# Patient Record
Sex: FEMALE | Race: WHITE
[De-identification: names, ages, dates, MRNs, and addresses within clinical notes are randomized per-mention and may not be internally consistent; named-entity substitution may affect disease eponyms.]

---

## 2017-09-08 ENCOUNTER — HOSPITAL ENCOUNTER (OUTPATIENT)
Dept: HOSPITAL 15 - RAD HDHVI | Age: 82
Discharge: HOME | End: 2017-09-08
Attending: INTERNAL MEDICINE
Payer: MEDICARE

## 2017-09-08 VITALS — BODY MASS INDEX: 25.49 KG/M2 | WEIGHT: 153 LBS | HEIGHT: 65 IN

## 2017-09-08 DIAGNOSIS — I10: Primary | ICD-10-CM

## 2017-09-08 DIAGNOSIS — E11.9: ICD-10-CM

## 2017-09-08 DIAGNOSIS — E78.00: ICD-10-CM

## 2017-09-08 DIAGNOSIS — R06.00: ICD-10-CM

## 2017-09-08 PROCEDURE — 78452 HT MUSCLE IMAGE SPECT MULT: CPT

## 2017-09-08 PROCEDURE — 96374 THER/PROPH/DIAG INJ IV PUSH: CPT

## 2017-09-08 PROCEDURE — 93005 ELECTROCARDIOGRAM TRACING: CPT

## 2017-09-08 PROCEDURE — 96375 TX/PRO/DX INJ NEW DRUG ADDON: CPT

## 2019-04-09 ENCOUNTER — HOSPITAL ENCOUNTER (OUTPATIENT)
Dept: HOSPITAL 15 - RAD HDHVI | Age: 84
Discharge: HOME | End: 2019-04-09
Attending: INTERNAL MEDICINE
Payer: MEDICARE

## 2019-04-09 DIAGNOSIS — R00.2: Primary | ICD-10-CM

## 2019-04-09 DIAGNOSIS — E78.5: ICD-10-CM

## 2019-04-09 DIAGNOSIS — E11.9: ICD-10-CM

## 2019-04-09 DIAGNOSIS — R00.0: ICD-10-CM

## 2019-04-09 DIAGNOSIS — I10: ICD-10-CM

## 2019-04-09 PROCEDURE — 93306 TTE W/DOPPLER COMPLETE: CPT

## 2019-04-09 PROCEDURE — 93880 EXTRACRANIAL BILAT STUDY: CPT

## 2019-04-29 ENCOUNTER — HOSPITAL ENCOUNTER (OUTPATIENT)
Dept: HOSPITAL 15 - RAD HDHVI | Age: 84
Discharge: HOME | End: 2019-04-29
Attending: INTERNAL MEDICINE
Payer: MEDICARE

## 2019-04-29 VITALS — WEIGHT: 154 LBS | BODY MASS INDEX: 25.66 KG/M2 | HEIGHT: 65 IN

## 2019-04-29 DIAGNOSIS — R06.02: ICD-10-CM

## 2019-04-29 DIAGNOSIS — I10: ICD-10-CM

## 2019-04-29 DIAGNOSIS — E11.9: Primary | ICD-10-CM

## 2019-04-29 DIAGNOSIS — E78.00: ICD-10-CM

## 2019-04-29 DIAGNOSIS — R06.00: ICD-10-CM

## 2019-04-29 PROCEDURE — 96375 TX/PRO/DX INJ NEW DRUG ADDON: CPT

## 2019-04-29 PROCEDURE — 93005 ELECTROCARDIOGRAM TRACING: CPT

## 2019-04-29 PROCEDURE — 78452 HT MUSCLE IMAGE SPECT MULT: CPT

## 2019-04-29 PROCEDURE — 96374 THER/PROPH/DIAG INJ IV PUSH: CPT

## 2019-09-30 ENCOUNTER — HOSPITAL ENCOUNTER (OUTPATIENT)
Dept: HOSPITAL 15 - RAD HDHVI | Age: 84
Discharge: HOME | End: 2019-09-30
Attending: INTERNAL MEDICINE
Payer: MEDICARE

## 2019-09-30 DIAGNOSIS — M16.12: Primary | ICD-10-CM

## 2019-09-30 DIAGNOSIS — M25.78: ICD-10-CM

## 2019-09-30 DIAGNOSIS — I70.90: ICD-10-CM

## 2019-09-30 DIAGNOSIS — M54.17: ICD-10-CM

## 2019-09-30 DIAGNOSIS — M48.04: ICD-10-CM

## 2019-09-30 DIAGNOSIS — N13.30: ICD-10-CM

## 2019-09-30 PROCEDURE — 73700 CT LOWER EXTREMITY W/O DYE: CPT

## 2019-09-30 PROCEDURE — 72131 CT LUMBAR SPINE W/O DYE: CPT

## 2020-02-10 ENCOUNTER — HOSPITAL ENCOUNTER (OUTPATIENT)
Dept: HOSPITAL 15 - LAB | Age: 85
Discharge: HOME | End: 2020-02-10
Attending: INTERNAL MEDICINE
Payer: MEDICARE

## 2020-02-10 DIAGNOSIS — R94.4: Primary | ICD-10-CM

## 2020-02-10 PROCEDURE — 82565 ASSAY OF CREATININE: CPT

## 2020-02-10 PROCEDURE — 36415 COLL VENOUS BLD VENIPUNCTURE: CPT

## 2020-02-11 ENCOUNTER — HOSPITAL ENCOUNTER (OUTPATIENT)
Dept: HOSPITAL 15 - RAD HDHVI | Age: 85
Discharge: HOME | End: 2020-02-11
Attending: INTERNAL MEDICINE
Payer: MEDICARE

## 2020-02-11 VITALS — DIASTOLIC BLOOD PRESSURE: 67 MMHG | SYSTOLIC BLOOD PRESSURE: 131 MMHG

## 2020-02-11 VITALS — DIASTOLIC BLOOD PRESSURE: 64 MMHG | SYSTOLIC BLOOD PRESSURE: 127 MMHG

## 2020-02-11 DIAGNOSIS — R10.9: ICD-10-CM

## 2020-02-11 DIAGNOSIS — K57.92: ICD-10-CM

## 2020-02-11 DIAGNOSIS — I70.0: Primary | ICD-10-CM

## 2020-02-11 DIAGNOSIS — R97.0: ICD-10-CM

## 2020-02-11 DIAGNOSIS — K44.9: ICD-10-CM

## 2020-02-11 PROCEDURE — 74177 CT ABD & PELVIS W/CONTRAST: CPT

## 2020-08-27 ENCOUNTER — HOSPITAL ENCOUNTER (INPATIENT)
Dept: HOSPITAL 15 - ER | Age: 85
LOS: 9 days | Discharge: NURSING, REHAB OR LONG-TERM CARE | DRG: 481 | End: 2020-09-05
Attending: INTERNAL MEDICINE | Admitting: INTERNAL MEDICINE
Payer: MEDICARE

## 2020-08-27 VITALS — HEIGHT: 62 IN | BODY MASS INDEX: 29.9 KG/M2 | WEIGHT: 162.48 LBS

## 2020-08-27 DIAGNOSIS — Z80.6: ICD-10-CM

## 2020-08-27 DIAGNOSIS — E44.0: ICD-10-CM

## 2020-08-27 DIAGNOSIS — M47.816: ICD-10-CM

## 2020-08-27 DIAGNOSIS — Z20.828: ICD-10-CM

## 2020-08-27 DIAGNOSIS — E11.9: ICD-10-CM

## 2020-08-27 DIAGNOSIS — S72.141A: Primary | ICD-10-CM

## 2020-08-27 DIAGNOSIS — M81.0: ICD-10-CM

## 2020-08-27 DIAGNOSIS — D62: ICD-10-CM

## 2020-08-27 DIAGNOSIS — E87.6: ICD-10-CM

## 2020-08-27 DIAGNOSIS — Z85.038: ICD-10-CM

## 2020-08-27 DIAGNOSIS — Z80.3: ICD-10-CM
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
## 2020-08-27 DIAGNOSIS — G47.33: ICD-10-CM
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
## 2020-08-27 DIAGNOSIS — E78.5: ICD-10-CM

## 2020-08-27 DIAGNOSIS — I10: ICD-10-CM
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
## 2020-08-27 DIAGNOSIS — R64: ICD-10-CM
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
## 2020-08-27 DIAGNOSIS — M47.812: ICD-10-CM

## 2020-08-27 LAB
ALBUMIN SERPL-MCNC: 3.2 G/DL (ref 3.4–5)
ALP SERPL-CCNC: 48 U/L (ref 45–117)
ALT SERPL-CCNC: 18 U/L (ref 13–56)
ANION GAP SERPL CALCULATED.3IONS-SCNC: 6 MMOL/L (ref 5–15)
APTT PPP: 20.5 SEC (ref 23–31.2)
BILIRUB SERPL-MCNC: 0.3 MG/DL (ref 0.2–1)
BUN SERPL-MCNC: 27 MG/DL (ref 7–18)
BUN/CREAT SERPL: 35.5
CALCIUM SERPL-MCNC: 8.2 MG/DL (ref 8.5–10.1)
CHLORIDE SERPL-SCNC: 110 MMOL/L (ref 98–107)
CO2 SERPL-SCNC: 24 MMOL/L (ref 21–32)
GLUCOSE SERPL-MCNC: 180 MG/DL (ref 74–106)
HCT VFR BLD AUTO: 32.2 % (ref 36–46)
HGB BLD-MCNC: 10.5 G/DL (ref 12.2–16.2)
INR PPP: 0.97 (ref 0.9–1.15)
MAGNESIUM SERPL-MCNC: 1.9 MG/DL (ref 1.6–2.6)
MCH RBC QN AUTO: 27.3 PG (ref 28–32)
MCV RBC AUTO: 84 FL (ref 80–100)
NRBC BLD QL AUTO: 0.1 %
POTASSIUM SERPL-SCNC: 3.4 MMOL/L (ref 3.5–5.1)
PROT SERPL-MCNC: 6.6 G/DL (ref 6.4–8.2)
SODIUM SERPL-SCNC: 140 MMOL/L (ref 136–145)

## 2020-08-27 PROCEDURE — 74176 CT ABD & PELVIS W/O CONTRAST: CPT

## 2020-08-27 PROCEDURE — 97116 GAIT TRAINING THERAPY: CPT

## 2020-08-27 PROCEDURE — 85014 HEMATOCRIT: CPT

## 2020-08-27 PROCEDURE — 97163 PT EVAL HIGH COMPLEX 45 MIN: CPT

## 2020-08-27 PROCEDURE — 81001 URINALYSIS AUTO W/SCOPE: CPT

## 2020-08-27 PROCEDURE — 82565 ASSAY OF CREATININE: CPT

## 2020-08-27 PROCEDURE — 86900 BLOOD TYPING SEROLOGIC ABO: CPT

## 2020-08-27 PROCEDURE — 72125 CT NECK SPINE W/O DYE: CPT

## 2020-08-27 PROCEDURE — 82962 GLUCOSE BLOOD TEST: CPT

## 2020-08-27 PROCEDURE — 86920 COMPATIBILITY TEST SPIN: CPT

## 2020-08-27 PROCEDURE — 36415 COLL VENOUS BLD VENIPUNCTURE: CPT

## 2020-08-27 PROCEDURE — 85730 THROMBOPLASTIN TIME PARTIAL: CPT

## 2020-08-27 PROCEDURE — 85018 HEMOGLOBIN: CPT

## 2020-08-27 PROCEDURE — 86901 BLOOD TYPING SEROLOGIC RH(D): CPT

## 2020-08-27 PROCEDURE — 70450 CT HEAD/BRAIN W/O DYE: CPT

## 2020-08-27 PROCEDURE — 76000 FLUOROSCOPY <1 HR PHYS/QHP: CPT

## 2020-08-27 PROCEDURE — 97530 THERAPEUTIC ACTIVITIES: CPT

## 2020-08-27 PROCEDURE — 73501 X-RAY EXAM HIP UNI 1 VIEW: CPT

## 2020-08-27 PROCEDURE — 83735 ASSAY OF MAGNESIUM: CPT

## 2020-08-27 PROCEDURE — 97110 THERAPEUTIC EXERCISES: CPT

## 2020-08-27 PROCEDURE — 72192 CT PELVIS W/O DYE: CPT

## 2020-08-27 PROCEDURE — 85025 COMPLETE CBC W/AUTO DIFF WBC: CPT

## 2020-08-27 PROCEDURE — 71045 X-RAY EXAM CHEST 1 VIEW: CPT

## 2020-08-27 PROCEDURE — 93005 ELECTROCARDIOGRAM TRACING: CPT

## 2020-08-27 PROCEDURE — 85610 PROTHROMBIN TIME: CPT

## 2020-08-27 PROCEDURE — 86850 RBC ANTIBODY SCREEN: CPT

## 2020-08-27 PROCEDURE — 80053 COMPREHEN METABOLIC PANEL: CPT

## 2020-08-27 PROCEDURE — 80048 BASIC METABOLIC PNL TOTAL CA: CPT

## 2020-08-28 VITALS — SYSTOLIC BLOOD PRESSURE: 132 MMHG | DIASTOLIC BLOOD PRESSURE: 70 MMHG

## 2020-08-28 VITALS — DIASTOLIC BLOOD PRESSURE: 55 MMHG | SYSTOLIC BLOOD PRESSURE: 117 MMHG

## 2020-08-28 VITALS — DIASTOLIC BLOOD PRESSURE: 67 MMHG | SYSTOLIC BLOOD PRESSURE: 139 MMHG

## 2020-08-28 VITALS — SYSTOLIC BLOOD PRESSURE: 138 MMHG | DIASTOLIC BLOOD PRESSURE: 73 MMHG

## 2020-08-28 RX ADMIN — Medication SCH TAB: at 09:45

## 2020-08-28 RX ADMIN — HYDROMORPHONE HYDROCHLORIDE PRN MG: 2 INJECTION, SOLUTION INTRAMUSCULAR; INTRAVENOUS; SUBCUTANEOUS at 09:45

## 2020-08-28 RX ADMIN — SODIUM CHLORIDE SCH MLS/HR: 0.9 INJECTION, SOLUTION INTRAVENOUS at 23:57

## 2020-08-28 RX ADMIN — PRAVASTATIN SODIUM SCH MG: 20 TABLET ORAL at 23:46

## 2020-08-28 RX ADMIN — TRIAMTERENE AND HYDROCHLOROTHIAZIDE SCH CAP: 25; 37.5 CAPSULE ORAL at 09:42

## 2020-08-28 RX ADMIN — CEFAZOLIN SODIUM SCH MLS/HR: 1 INJECTION, SOLUTION INTRAVENOUS at 23:44

## 2020-08-28 RX ADMIN — SODIUM CHLORIDE SCH MLS/HR: 0.9 INJECTION, SOLUTION INTRAVENOUS at 11:30

## 2020-08-28 NOTE — NUR
CLOSING SHIFT NOTE

ENDORSED CARE FOR NIGHT SHIFT RN. PATIENT HAS NO S/S OF DISTRESS/SOB OR PAIN AT THIS TIME.

## 2020-08-28 NOTE — NUR
WOUND CARE NOTE: WOUND CARE IN TO SEE PATIENT PER WOUND CARE REQUEST. PATIENT ADMITTED TO 
[facility] FOR RIGHT HIP FRACTURE. PATIENT HAS NO OPEN WOUNDS. PATIENT IS SCHEDULED FOR SURGERY 
LATER IN THE DAY. PATIENT RYNE SCORE IS 15. 



RECOMMEND: FREQUENT Q2HOUR REPOSITIONING AS CONDITION ALLOWS. REDISTRIBUTE PRESSURE 
UTILIZING PILLOWS AND WEDGES. SKIN/WOUND CARE PLAN. CONTINUED MONITORING BY WOUND CARE TEAM.

## 2020-08-28 NOTE — NUR
PATIENT TAKEN DOWN TO PRE-OP VIA BED. REPORT GIVEN TO PREOP RN BECCA. PATIENT HAS NO S/S OF 
DISTRESS/SOB OR PAIN AT THIS TIME.

## 2020-08-28 NOTE — NUR
Patient arrived on the floor on a stretcher for right hip fracture. Alert and oriented x4. 
No SOB and no acute distress seen. Only experience pain on activity as per patient. Refused 
pain meds right now. Educate on fall safety. Call light within reach, bed on low position, 
refused non skid socks. Inform POC. patient verbalized understanding.Will continue to 
educate and monitor patient.

## 2020-08-28 NOTE — NUR
Opening Shift Note

Assumed care of patient, AAOX4.  No S/S of distress/SOB or pain. Dressing X2 to right hip 
C/D/I and pedal pulses present. NS running @ 100mL. Casillas patent and draining to gravity. 
Bed in lowest locked position, safety precautions in place and call light within reach. 
Instructed on POC and to call for assist PRN, will continue to monitor for changes Q1hr and 
PRN.

-------------------------------------------------------------------------------

Signed:    08/29/20 at 0704 by FLAKO TREVIZO <Co-Signature Required>

Co-Signed: 08/29/20 at 0704 by Vonnie Padilla RN

-------------------------------------------------------------------------------

## 2020-08-28 NOTE — NUR
OPENING SHIFT NOTE

ASSUMED CARE OF PATIENT FROM NIGHT SHIFT RN STEVEN. PATIENT IS AWAKE, ALERT, AND ORIENTED X4. 
PATIENT HAS NO S/S OF DISTRESS/SOB OR PAIN. INSTRUCTED PATIENT ON POC, PATIENT VERBALIZED 
UNDERSTANDING. BED IS IN LOWEST POSITION WITH SIDE RAILS RAISED X2, BED WHEELS LOCKED, AND 
CALL LIGHT IS WITHIN REACH. WILL CONTINUE TO MONITOR.

## 2020-08-29 VITALS — SYSTOLIC BLOOD PRESSURE: 118 MMHG | DIASTOLIC BLOOD PRESSURE: 59 MMHG

## 2020-08-29 VITALS — SYSTOLIC BLOOD PRESSURE: 126 MMHG | DIASTOLIC BLOOD PRESSURE: 53 MMHG

## 2020-08-29 VITALS — SYSTOLIC BLOOD PRESSURE: 132 MMHG | DIASTOLIC BLOOD PRESSURE: 72 MMHG

## 2020-08-29 VITALS — SYSTOLIC BLOOD PRESSURE: 110 MMHG | DIASTOLIC BLOOD PRESSURE: 55 MMHG

## 2020-08-29 VITALS — DIASTOLIC BLOOD PRESSURE: 50 MMHG | SYSTOLIC BLOOD PRESSURE: 98 MMHG

## 2020-08-29 PROCEDURE — 0QS604Z REPOSITION RIGHT UPPER FEMUR WITH INTERNAL FIXATION DEVICE, OPEN APPROACH: ICD-10-PCS | Performed by: ORTHOPAEDIC SURGERY

## 2020-08-29 RX ADMIN — CEFAZOLIN SODIUM SCH MLS/HR: 1 INJECTION, SOLUTION INTRAVENOUS at 23:22

## 2020-08-29 RX ADMIN — PRAVASTATIN SODIUM SCH MG: 20 TABLET ORAL at 23:11

## 2020-08-29 RX ADMIN — CEFAZOLIN SODIUM SCH MLS/HR: 1 INJECTION, SOLUTION INTRAVENOUS at 05:51

## 2020-08-29 RX ADMIN — CEFAZOLIN SODIUM SCH MLS/HR: 1 INJECTION, SOLUTION INTRAVENOUS at 16:47

## 2020-08-29 RX ADMIN — SODIUM CHLORIDE SCH MLS/HR: 0.9 INJECTION, SOLUTION INTRAVENOUS at 18:29

## 2020-08-29 RX ADMIN — TRIAMTERENE AND HYDROCHLOROTHIAZIDE SCH CAP: 25; 37.5 CAPSULE ORAL at 10:42

## 2020-08-29 RX ADMIN — HYDROMORPHONE HYDROCHLORIDE PRN MG: 2 INJECTION, SOLUTION INTRAMUSCULAR; INTRAVENOUS; SUBCUTANEOUS at 10:42

## 2020-08-29 RX ADMIN — SODIUM CHLORIDE SCH MLS/HR: 0.9 INJECTION, SOLUTION INTRAVENOUS at 07:30

## 2020-08-29 RX ADMIN — Medication SCH TAB: at 10:43

## 2020-08-29 NOTE — NUR
CLOSING SHIFT NOTE

ENDORSED CARE TO NIGHT SHIFT CARI ARRIOLA. PATIENT HAS NO S/S OF DISTRESS/SOB OR PAIN AT THIS 
TIME.

## 2020-08-30 VITALS — SYSTOLIC BLOOD PRESSURE: 129 MMHG | DIASTOLIC BLOOD PRESSURE: 57 MMHG

## 2020-08-30 VITALS — SYSTOLIC BLOOD PRESSURE: 122 MMHG | DIASTOLIC BLOOD PRESSURE: 55 MMHG

## 2020-08-30 VITALS — SYSTOLIC BLOOD PRESSURE: 128 MMHG | DIASTOLIC BLOOD PRESSURE: 57 MMHG

## 2020-08-30 VITALS — SYSTOLIC BLOOD PRESSURE: 123 MMHG | DIASTOLIC BLOOD PRESSURE: 60 MMHG

## 2020-08-30 VITALS — SYSTOLIC BLOOD PRESSURE: 120 MMHG | DIASTOLIC BLOOD PRESSURE: 62 MMHG

## 2020-08-30 LAB
ANION GAP SERPL CALCULATED.3IONS-SCNC: 3 MMOL/L (ref 5–15)
BUN SERPL-MCNC: 25 MG/DL (ref 7–18)
BUN/CREAT SERPL: 25
CALCIUM SERPL-MCNC: 8.2 MG/DL (ref 8.5–10.1)
CHLORIDE SERPL-SCNC: 104 MMOL/L (ref 98–107)
CO2 SERPL-SCNC: 30 MMOL/L (ref 21–32)
GLUCOSE SERPL-MCNC: 188 MG/DL (ref 74–106)
HCT VFR BLD AUTO: 21.2 % (ref 36–46)
HGB BLD-MCNC: 7 G/DL (ref 12.2–16.2)
MCH RBC QN AUTO: 28.1 PG (ref 28–32)
MCV RBC AUTO: 85.3 FL (ref 80–100)
NRBC BLD QL AUTO: 0 %
POTASSIUM SERPL-SCNC: 4.1 MMOL/L (ref 3.5–5.1)
SODIUM SERPL-SCNC: 137 MMOL/L (ref 136–145)

## 2020-08-30 RX ADMIN — HYDROMORPHONE HYDROCHLORIDE PRN MG: 2 INJECTION, SOLUTION INTRAMUSCULAR; INTRAVENOUS; SUBCUTANEOUS at 21:10

## 2020-08-30 RX ADMIN — HYDROMORPHONE HYDROCHLORIDE PRN MG: 2 INJECTION, SOLUTION INTRAMUSCULAR; INTRAVENOUS; SUBCUTANEOUS at 08:01

## 2020-08-30 RX ADMIN — CEFAZOLIN SODIUM SCH MLS/HR: 1 INJECTION, SOLUTION INTRAVENOUS at 15:06

## 2020-08-30 RX ADMIN — SODIUM CHLORIDE SCH MLS/HR: 0.9 INJECTION, SOLUTION INTRAVENOUS at 10:32

## 2020-08-30 RX ADMIN — HYDROMORPHONE HYDROCHLORIDE PRN MG: 2 INJECTION, SOLUTION INTRAMUSCULAR; INTRAVENOUS; SUBCUTANEOUS at 01:47

## 2020-08-30 RX ADMIN — TRIAMTERENE AND HYDROCHLOROTHIAZIDE SCH CAP: 25; 37.5 CAPSULE ORAL at 10:31

## 2020-08-30 RX ADMIN — CEFAZOLIN SODIUM SCH MLS/HR: 1 INJECTION, SOLUTION INTRAVENOUS at 05:36

## 2020-08-30 RX ADMIN — PRAVASTATIN SODIUM SCH MG: 20 TABLET ORAL at 22:45

## 2020-08-30 RX ADMIN — SODIUM CHLORIDE SCH MLS/HR: 0.9 INJECTION, SOLUTION INTRAVENOUS at 10:30

## 2020-08-30 RX ADMIN — Medication SCH TAB: at 10:31

## 2020-08-30 RX ADMIN — CEFAZOLIN SODIUM SCH MLS/HR: 1 INJECTION, SOLUTION INTRAVENOUS at 22:00

## 2020-08-30 RX ADMIN — Medication PRN ML: at 18:02

## 2020-08-30 RX ADMIN — SODIUM CHLORIDE SCH MLS/HR: 0.9 INJECTION, SOLUTION INTRAVENOUS at 23:30

## 2020-08-30 NOTE — NUR
Pt requesting prn for pain.  Educated on side effect of pain medication is constipation.  Pt 
verbalized understanding and sted she couldn't move.  Pt encouraged to reach for objects 
such as water and use bed controls to raise head up and down.  Pt was previously using call 
light to request h.o.b. to be raised, pt shown how to use bed controls. Pt returned 
demonstration.

## 2020-08-30 NOTE — NUR
MD BUSTILLOS AT BEDSIDE

UPDATED MD ON PATIENT'S STATUS. INFORMED MD PATIENT WOULD LIKE TO CONTINUE HER METFORMIN PER 
MD THAT IS OKAY TO CONTINUE.

## 2020-08-30 NOTE — NUR
Assumed care of pt who is alert and oriented, c/o not having BM.  Reassured that prn for 
constipation recently given by previuos nurse should work.  Pt explained her entire 
background r/t illness.  No other s/s of distress noted.

## 2020-08-30 NOTE — NUR
Surgical dressing removed from Pt's R lat thigh and hip. No bruising nor discoloration at 
either site. Wounds are flat with edges approximated and all staples intact. Skin around 
each wound cleansed gently with wound cleanser keeping incisions dry. [place]apore dressings 
applied to cover both incisions. Pt lola well.

## 2020-08-30 NOTE — NUR
CLOSING SHIFT NOTE

ENDORSED CARE TO NIGHT SHIFT RN OSIEL. PATIENT HAS NO S/S OF DISTRESS/SOB OR PAIN AT THIS 
TIME.

## 2020-08-30 NOTE — NUR
OPENING SHIFT NOTE

ASSUMED CARE OF PATIENT FROM NIGHT SHIFT RN STEVEN. PATIENT IS AWAKE, ALERT, AND ORIENTED X4. 
PATIENT HAS NO S/S OF DISTRESS/SOB OR PAIN. INSTRUCTED PATIENT ON POC, PATIENT VERBALIZED 
UNDERSTANDING. BED IS IN LOWEST POSITION WITH SIDE RAILS RAISED X2, BED WHEELS LOCKED, GLEZ 
IS HANGING BELOW BLADDER AND IS DRAINING YELLOW URINE AND CALL LIGHT IS WITHIN REACH. WILL 
CONTINUE TO MONITOR.

## 2020-08-31 VITALS — DIASTOLIC BLOOD PRESSURE: 69 MMHG | SYSTOLIC BLOOD PRESSURE: 135 MMHG

## 2020-08-31 VITALS — SYSTOLIC BLOOD PRESSURE: 127 MMHG | DIASTOLIC BLOOD PRESSURE: 95 MMHG

## 2020-08-31 VITALS — SYSTOLIC BLOOD PRESSURE: 124 MMHG | DIASTOLIC BLOOD PRESSURE: 60 MMHG

## 2020-08-31 VITALS — SYSTOLIC BLOOD PRESSURE: 146 MMHG | DIASTOLIC BLOOD PRESSURE: 73 MMHG

## 2020-08-31 VITALS — SYSTOLIC BLOOD PRESSURE: 132 MMHG | DIASTOLIC BLOOD PRESSURE: 78 MMHG

## 2020-08-31 VITALS — DIASTOLIC BLOOD PRESSURE: 67 MMHG | SYSTOLIC BLOOD PRESSURE: 158 MMHG

## 2020-08-31 VITALS — SYSTOLIC BLOOD PRESSURE: 121 MMHG | DIASTOLIC BLOOD PRESSURE: 60 MMHG

## 2020-08-31 VITALS — DIASTOLIC BLOOD PRESSURE: 69 MMHG | SYSTOLIC BLOOD PRESSURE: 136 MMHG

## 2020-08-31 VITALS — DIASTOLIC BLOOD PRESSURE: 74 MMHG | SYSTOLIC BLOOD PRESSURE: 131 MMHG

## 2020-08-31 VITALS — DIASTOLIC BLOOD PRESSURE: 67 MMHG | SYSTOLIC BLOOD PRESSURE: 135 MMHG

## 2020-08-31 VITALS — DIASTOLIC BLOOD PRESSURE: 68 MMHG | SYSTOLIC BLOOD PRESSURE: 135 MMHG

## 2020-08-31 VITALS — DIASTOLIC BLOOD PRESSURE: 78 MMHG | SYSTOLIC BLOOD PRESSURE: 132 MMHG

## 2020-08-31 LAB
HCT VFR BLD AUTO: 20.4 % (ref 36–46)
HCT VFR BLD AUTO: 24.9 % (ref 36–46)
HGB BLD-MCNC: 6.8 G/DL (ref 12.2–16.2)
HGB BLD-MCNC: 8.6 G/DL (ref 12.2–16.2)
MCH RBC QN AUTO: 28.3 PG (ref 28–32)
MCV RBC AUTO: 84.5 FL (ref 80–100)
NRBC BLD QL AUTO: 0 %

## 2020-08-31 PROCEDURE — 30233N1 TRANSFUSION OF NONAUTOLOGOUS RED BLOOD CELLS INTO PERIPHERAL VEIN, PERCUTANEOUS APPROACH: ICD-10-PCS | Performed by: INTERNAL MEDICINE

## 2020-08-31 RX ADMIN — Medication SCH TAB: at 11:00

## 2020-08-31 RX ADMIN — CEFAZOLIN SODIUM SCH MLS/HR: 1 INJECTION, SOLUTION INTRAVENOUS at 21:37

## 2020-08-31 RX ADMIN — SODIUM CHLORIDE SCH MLS/HR: 0.9 INJECTION, SOLUTION INTRAVENOUS at 20:26

## 2020-08-31 RX ADMIN — CEFAZOLIN SODIUM SCH MLS/HR: 1 INJECTION, SOLUTION INTRAVENOUS at 14:00

## 2020-08-31 RX ADMIN — SODIUM CHLORIDE SCH MLS/HR: 0.9 INJECTION, SOLUTION INTRAVENOUS at 09:30

## 2020-08-31 RX ADMIN — TRIAMTERENE AND HYDROCHLOROTHIAZIDE SCH CAP: 25; 37.5 CAPSULE ORAL at 11:00

## 2020-08-31 RX ADMIN — PRAVASTATIN SODIUM SCH MG: 20 TABLET ORAL at 21:37

## 2020-08-31 RX ADMIN — HYDROMORPHONE HYDROCHLORIDE PRN MG: 2 INJECTION, SOLUTION INTRAMUSCULAR; INTRAVENOUS; SUBCUTANEOUS at 03:54

## 2020-08-31 RX ADMIN — CEFAZOLIN SODIUM SCH MLS/HR: 1 INJECTION, SOLUTION INTRAVENOUS at 06:10

## 2020-08-31 NOTE — NUR
RECEIVED CALL FROM MD BUSTILLOS. 

UPDATED MD ON PATIENT'S STATUS INCLUDING HEMOGLOBIN LEVEL. MD IS AWARE AND ORDERED 2 UNITS 
PRBCs TO BE GIVEN AND H AND H TO BE RECHECKED 1 HOUR POST TRANSFUSION. INFORMED MD HOSPITAL 
PROTOCOL IS TO TRANSFUSE 1 UNIT AND RECHECK H AND H AFTER ONE HOUR. PER MD HE WANTS TWO 
UNITS TO BE GIVEN ANY WAYS, INFORMED CHARGE RN AND SHE INFORMED CNO.

## 2020-08-31 NOTE — NUR
OPENING SHIFT NOTE

ASSUMED CARE OF PATIENT. PATIENT IS AWAKE, ALERT, AND ORIENTED X4. PATIENT HAS NO S/S OF 
DISTRESS/SOB OR PAIN. INSTRUCTED PATIENT ON POC, PATIENT VERBALIZED UNDERSTANDING. BED IS IN 
LOWEST POSITION WITH SIDE RAILS RAISED X2, BED WHEELS LOCKED, GLEZ IS HANGING BELOW BLADDER 
AND IS DRAINING YELLOW URINE, BEDSIDE COMMODE AND CALL LIGHT IS WITHIN REACH. WILL CONTINUE 
TO MONITOR.

## 2020-08-31 NOTE — NUR
OPENING SHIFT NOTE

ASSUMED CARE OF PATIENT FROM NIGHT SHIFT RN OSIEL. PATIENT IS AWAKE, ALERT, AND ORIENTED 
X4. PATIENT HAS NO S/S OF DISTRESS/SOB OR PAIN. INSTRUCTED PATIENT ON POC, PATIENT 
VERBALIZED UNDERSTANDING. BED IS IN LOWEST POSITION WITH SIDE RAILS RAISED X2, BED WHEELS 
LOCKED, GLEZ IS HANGING BELOW BLADDER AND IS DRAINING YELLOW URINE, BEDSIDE COMMODE AND 
CALL LIGHT IS WITHIN REACH. WILL CONTINUE TO MONITOR.

## 2020-08-31 NOTE — NUR
Nutrition Assessment Notes



Please refer to link for full assessment notes.



Est Energy needs: 1668-0368 kcals (20-23 kcal/kgBW)

Est Protein needs: 73-80 gms/day (1.0-1.1 gm/kgBW)



Will continue to monitor and reassess prn.

-------------------------------------------------------------------------------

Addendum: 08/31/20 at 1321 by Adrianna Stein RD

-------------------------------------------------------------------------------

Amended: Links added.

## 2020-08-31 NOTE — NUR
CALLED BLOOD BANK FOR SECOND UNIT OF BLOOD. THEY SAID IT WILL BE READY IN 10 MINUTES BECAUSE 
THEY HAVE TO CROSS MATCH. WILL AWAIT CALL BACK.

## 2020-08-31 NOTE — NUR
MD BUSTILLOS AT BEDSIDE

UPDATED MD ON PATIENT'S STATUS INCLUDING WANTING DILAUDID CHANGED, MD IS AWARE AND ORDERED 
NORCO TO BE GIVEN FOR PAIN MANAGEMENT AND MD ORDERED STAT CT WITHOUT CONTRAST WILL FOLLOW 
THROUGH WITH ORDERS.

## 2020-08-31 NOTE — NUR
PHYSICAL THERAPY AT BEDSIDE. PER PT IV ACCIDENTLY GOT PULLED OUT. PRESSURE DRESSING APPLIED. 
WILL INSERT NEW IV.

## 2020-08-31 NOTE — NUR
assessment

Patient is a 87 year old female who is alert and oriented. Prior to admission patient lived 
home alone and functioned independently. Per patients daughter Brina patient has a cane and 
wheelchair for home use. Brina informed me patient was sitting on her bar stool and she 
twisted when getting up and fell on her right side and fractured her hip. Per Brina family 
wants patient to return home on discharge. Patient will need a fww and a ss consult for home 
health PT. Per Brina she prefers who Dr Cartwright recommends [facility]. I informed 
Brina I will continue to monitor and follow up as appropriate. Brina verbalized 
understanding. 

-------------------------------------------------------------------------------

Addendum: 08/31/20 at 1650 by Bebe SPICER

-------------------------------------------------------------------------------

Amended: Links added.

## 2020-08-31 NOTE — NUR
CLOSING SHIFT NOTE

ENDORSED CARE TO NIGHT SHIFT RN . PATIENT HAS NO S/S OF DISTRESS/SOB OR PAIN AT THIS TIME.

## 2020-09-01 VITALS — DIASTOLIC BLOOD PRESSURE: 68 MMHG | SYSTOLIC BLOOD PRESSURE: 140 MMHG

## 2020-09-01 VITALS — SYSTOLIC BLOOD PRESSURE: 124 MMHG | DIASTOLIC BLOOD PRESSURE: 60 MMHG

## 2020-09-01 VITALS — DIASTOLIC BLOOD PRESSURE: 69 MMHG | SYSTOLIC BLOOD PRESSURE: 138 MMHG

## 2020-09-01 VITALS — DIASTOLIC BLOOD PRESSURE: 69 MMHG | SYSTOLIC BLOOD PRESSURE: 151 MMHG

## 2020-09-01 LAB
HCT VFR BLD AUTO: 30.1 % (ref 36–46)
HGB BLD-MCNC: 10.2 G/DL (ref 12.2–16.2)
MCH RBC QN AUTO: 29.6 PG (ref 28–32)
MCV RBC AUTO: 87.4 FL (ref 80–100)
NRBC BLD QL AUTO: 0.1 %

## 2020-09-01 RX ADMIN — CEFAZOLIN SODIUM SCH MLS/HR: 1 INJECTION, SOLUTION INTRAVENOUS at 05:25

## 2020-09-01 RX ADMIN — CEFAZOLIN SODIUM SCH MLS/HR: 1 INJECTION, SOLUTION INTRAVENOUS at 14:00

## 2020-09-01 RX ADMIN — PRAVASTATIN SODIUM SCH MG: 20 TABLET ORAL at 23:00

## 2020-09-01 RX ADMIN — Medication SCH TAB: at 09:25

## 2020-09-01 RX ADMIN — SODIUM CHLORIDE SCH MLS/HR: 0.9 INJECTION, SOLUTION INTRAVENOUS at 15:30

## 2020-09-01 RX ADMIN — SODIUM CHLORIDE SCH MLS/HR: 0.9 INJECTION, SOLUTION INTRAVENOUS at 05:25

## 2020-09-01 RX ADMIN — SODIUM CHLORIDE SCH MLS/HR: 9 INJECTION, SOLUTION INTRAVENOUS at 13:00

## 2020-09-01 RX ADMIN — TRIAMTERENE AND HYDROCHLOROTHIAZIDE SCH CAP: 25; 37.5 CAPSULE ORAL at 09:25

## 2020-09-01 RX ADMIN — CEFAZOLIN SODIUM SCH MLS/HR: 1 INJECTION, SOLUTION INTRAVENOUS at 22:59

## 2020-09-01 NOTE — NUR
Opening Shift Note

Assumed care of patient. Awake, alert and oriented x4. No S/S of distress/SOB or pain. Pt is 
on 2L NC with even and unlabored respirations. Casillas is off the floor, patent and draining 
clear, yellow urine. Dressing to right thigh is clean, dry and intact. Instructed on POC and 
to call for assist PRN. Bed locked, in lowest position, call light within reach, side rails 
up. Will continue to monitor for changes Q1hr and PRN.

## 2020-09-02 VITALS — SYSTOLIC BLOOD PRESSURE: 90 MMHG | DIASTOLIC BLOOD PRESSURE: 64 MMHG

## 2020-09-02 VITALS — DIASTOLIC BLOOD PRESSURE: 63 MMHG | SYSTOLIC BLOOD PRESSURE: 143 MMHG

## 2020-09-02 VITALS — DIASTOLIC BLOOD PRESSURE: 72 MMHG | SYSTOLIC BLOOD PRESSURE: 139 MMHG

## 2020-09-02 VITALS — SYSTOLIC BLOOD PRESSURE: 104 MMHG | DIASTOLIC BLOOD PRESSURE: 81 MMHG

## 2020-09-02 VITALS — SYSTOLIC BLOOD PRESSURE: 144 MMHG | DIASTOLIC BLOOD PRESSURE: 67 MMHG

## 2020-09-02 VITALS — DIASTOLIC BLOOD PRESSURE: 68 MMHG | SYSTOLIC BLOOD PRESSURE: 121 MMHG

## 2020-09-02 LAB
HCT VFR BLD AUTO: 28 % (ref 36–46)
HGB BLD-MCNC: 9.3 G/DL (ref 12.2–16.2)
MCH RBC QN AUTO: 28.8 PG (ref 28–32)
MCV RBC AUTO: 87.1 FL (ref 80–100)
NRBC BLD QL AUTO: 0.1 %

## 2020-09-02 RX ADMIN — CEFAZOLIN SODIUM SCH MLS/HR: 1 INJECTION, SOLUTION INTRAVENOUS at 21:51

## 2020-09-02 RX ADMIN — SODIUM CHLORIDE SCH MLS/HR: 0.9 INJECTION, SOLUTION INTRAVENOUS at 12:53

## 2020-09-02 RX ADMIN — SODIUM CHLORIDE SCH MLS/HR: 0.9 INJECTION, SOLUTION INTRAVENOUS at 21:30

## 2020-09-02 RX ADMIN — SODIUM CHLORIDE SCH MLS/HR: 9 INJECTION, SOLUTION INTRAVENOUS at 12:44

## 2020-09-02 RX ADMIN — CEFAZOLIN SODIUM SCH MLS/HR: 1 INJECTION, SOLUTION INTRAVENOUS at 15:20

## 2020-09-02 RX ADMIN — TRIAMTERENE AND HYDROCHLOROTHIAZIDE SCH CAP: 25; 37.5 CAPSULE ORAL at 10:25

## 2020-09-02 RX ADMIN — SODIUM CHLORIDE SCH MLS/HR: 0.9 INJECTION, SOLUTION INTRAVENOUS at 02:45

## 2020-09-02 RX ADMIN — PRAVASTATIN SODIUM SCH MG: 20 TABLET ORAL at 21:52

## 2020-09-02 RX ADMIN — Medication SCH TAB: at 12:43

## 2020-09-02 RX ADMIN — CEFAZOLIN SODIUM SCH MLS/HR: 1 INJECTION, SOLUTION INTRAVENOUS at 05:44

## 2020-09-02 NOTE — NUR
Scheduled po and IV medications given per order. Patient eating lunch with no distress 
noted. Patient stable.

## 2020-09-02 NOTE — NUR
Opening Shift Note

Assumed patient care from Marcia ALCALA. Patient AOx 4 w/ HOB at 30 degrees. Patient has no s/s 
of distress or SOB. Patient is sitting in bed w/ bed locked in lowest position. Will 
continue to monitor.

## 2020-09-02 NOTE — NUR
Patient sitting in chair at bedside. Denies any pain at this time. Scheduled medications 
given per order. Patient stable.

## 2020-09-03 VITALS — DIASTOLIC BLOOD PRESSURE: 74 MMHG | SYSTOLIC BLOOD PRESSURE: 140 MMHG

## 2020-09-03 VITALS — DIASTOLIC BLOOD PRESSURE: 68 MMHG | SYSTOLIC BLOOD PRESSURE: 139 MMHG

## 2020-09-03 VITALS — DIASTOLIC BLOOD PRESSURE: 97 MMHG | SYSTOLIC BLOOD PRESSURE: 144 MMHG

## 2020-09-03 VITALS — DIASTOLIC BLOOD PRESSURE: 74 MMHG | SYSTOLIC BLOOD PRESSURE: 144 MMHG

## 2020-09-03 VITALS — DIASTOLIC BLOOD PRESSURE: 63 MMHG | SYSTOLIC BLOOD PRESSURE: 143 MMHG

## 2020-09-03 VITALS — SYSTOLIC BLOOD PRESSURE: 144 MMHG | DIASTOLIC BLOOD PRESSURE: 73 MMHG

## 2020-09-03 RX ADMIN — PRAVASTATIN SODIUM SCH MG: 20 TABLET ORAL at 21:49

## 2020-09-03 RX ADMIN — Medication SCH TAB: at 10:00

## 2020-09-03 RX ADMIN — SODIUM CHLORIDE SCH MLS/HR: 9 INJECTION, SOLUTION INTRAVENOUS at 12:20

## 2020-09-03 RX ADMIN — SODIUM CHLORIDE SCH MLS/HR: 0.9 INJECTION, SOLUTION INTRAVENOUS at 18:34

## 2020-09-03 RX ADMIN — CEFAZOLIN SODIUM SCH MLS/HR: 1 INJECTION, SOLUTION INTRAVENOUS at 05:34

## 2020-09-03 RX ADMIN — CEFAZOLIN SODIUM SCH MLS/HR: 1 INJECTION, SOLUTION INTRAVENOUS at 13:40

## 2020-09-03 RX ADMIN — SODIUM CHLORIDE SCH MLS/HR: 0.9 INJECTION, SOLUTION INTRAVENOUS at 07:30

## 2020-09-03 RX ADMIN — CEFAZOLIN SODIUM SCH MLS/HR: 1 INJECTION, SOLUTION INTRAVENOUS at 21:49

## 2020-09-03 RX ADMIN — TRIAMTERENE AND HYDROCHLOROTHIAZIDE SCH CAP: 25; 37.5 CAPSULE ORAL at 09:36

## 2020-09-03 NOTE — NUR
ASSESSMENT NOTE

PT IS ALERT ORIENTED X4, RESTING IN BED IN LOW COLEMAN POSITION, ABLE TO SELF REPOSITION AND 
VERBALIS HER DEMANDS, DRY CLEAN DRESSING NOTED AT RT HIP AREA, MILD EDEMA WITH SWOLLEN NOTED 
AT RT LEG TO THE ANKLE NOTED, PAIN 0/10, GLEZ CATHETER TO GRAVITY, FALL RISK PRECAUTIONS, 
NEXT TO THE NURSING STATION, CALL LIGHT WITHIN REACH, CALL LIGHT WITHIN REACH

## 2020-09-03 NOTE — NUR
Nutrition Followup Notes



Pt wt is 72.5 kg



Pt was awake when rounded this morning.  Pt is with a Cardiac 2gNa diet, appetite is poor 
aeb ave 48% PO intake over 5 meals per RN doc.  Pt stated that she just can't eat all of the 
meals given to her, that it is too much food for her. Noted that the pt is 87 years old and 
appetites are typically reduced in the elderly.  Encouraged pt to try to increase her PO 
intake a little.



Est Energy needs: 5135-6210 kcals (20-23 kcal/kgBW)

Est Protein needs: 73-80 gms/day (1.0-1.1 gm/kgBW)



Will continue to monitor and reassess prn.



LABS:  BUN 25 H, Gluc 188 H, Ca 8.2 L, Alb 3.2 L



GI: Pt had 1 BM on 9/02 per RN doc.



BS: 14 mod risk.  Refer to wound assessment report for full details.



PES:

Altered nutrition related lab values r/t current/chronic medical condition aeb elev BUN, 
hyperglycemia, hypocalcemis, mild hypoalbuminemia



Comments 

Will continue to monitor PO status, skin status, pertinent labs and weight trends. Will f/u 
in 3-5 days.

1) Continue to carefully monitor pt PO intake to meet at least 75% of meals

2) Continue current plan of care

## 2020-09-04 VITALS — DIASTOLIC BLOOD PRESSURE: 75 MMHG | SYSTOLIC BLOOD PRESSURE: 145 MMHG

## 2020-09-04 VITALS — SYSTOLIC BLOOD PRESSURE: 150 MMHG | DIASTOLIC BLOOD PRESSURE: 78 MMHG

## 2020-09-04 VITALS — SYSTOLIC BLOOD PRESSURE: 143 MMHG | DIASTOLIC BLOOD PRESSURE: 74 MMHG

## 2020-09-04 VITALS — DIASTOLIC BLOOD PRESSURE: 76 MMHG | SYSTOLIC BLOOD PRESSURE: 147 MMHG

## 2020-09-04 VITALS — SYSTOLIC BLOOD PRESSURE: 140 MMHG | DIASTOLIC BLOOD PRESSURE: 73 MMHG

## 2020-09-04 LAB
HCT VFR BLD AUTO: 27.5 % (ref 36–46)
HGB BLD-MCNC: 9.2 G/DL (ref 12.2–16.2)
MCH RBC QN AUTO: 29.6 PG (ref 28–32)
MCV RBC AUTO: 88.2 FL (ref 80–100)
NRBC BLD QL AUTO: 0.1 %

## 2020-09-04 RX ADMIN — CEFAZOLIN SODIUM SCH MLS/HR: 1 INJECTION, SOLUTION INTRAVENOUS at 05:31

## 2020-09-04 RX ADMIN — Medication SCH TAB: at 13:53

## 2020-09-04 RX ADMIN — ACETAMINOPHEN PRN MG: 325 TABLET ORAL at 01:57

## 2020-09-04 RX ADMIN — ACETAMINOPHEN PRN MG: 325 TABLET ORAL at 21:30

## 2020-09-04 RX ADMIN — SODIUM CHLORIDE SCH MLS/HR: 0.9 INJECTION, SOLUTION INTRAVENOUS at 03:30

## 2020-09-04 RX ADMIN — PRAVASTATIN SODIUM SCH MG: 20 TABLET ORAL at 21:20

## 2020-09-04 RX ADMIN — SODIUM CHLORIDE SCH MLS/HR: 9 INJECTION, SOLUTION INTRAVENOUS at 13:52

## 2020-09-04 RX ADMIN — SODIUM CHLORIDE SCH MLS/HR: 0.9 INJECTION, SOLUTION INTRAVENOUS at 09:35

## 2020-09-04 RX ADMIN — CEFAZOLIN SODIUM SCH MLS/HR: 1 INJECTION, SOLUTION INTRAVENOUS at 21:20

## 2020-09-04 RX ADMIN — TRIAMTERENE AND HYDROCHLOROTHIAZIDE SCH CAP: 25; 37.5 CAPSULE ORAL at 09:14

## 2020-09-04 RX ADMIN — SODIUM CHLORIDE SCH MLS/HR: 0.9 INJECTION, SOLUTION INTRAVENOUS at 23:30

## 2020-09-04 RX ADMIN — CEFAZOLIN SODIUM SCH MLS/HR: 1 INJECTION, SOLUTION INTRAVENOUS at 14:21

## 2020-09-04 RX ADMIN — ENOXAPARIN SODIUM SCH MG: 40 INJECTION SUBCUTANEOUS at 09:13

## 2020-09-04 RX ADMIN — ACETAMINOPHEN PRN MG: 325 TABLET ORAL at 09:34

## 2020-09-04 RX ADMIN — Medication PRN ML: at 09:13

## 2020-09-04 NOTE — NUR
WOUND CARE NOTE: WOUND CARE IN TO SEE PATIENT FOR REEVALUATION. PATIENT REMAINS TO BE WOUND 
FREE. PATIENT RYNE SCORE IS 16. MOISTURE BARRIER CREAM AND OPTIFOAM GENTLE SACRAL DRESSING 
AS PREVENTATIVE.



RECOMMEND: FREQUENT Q2HOUR REPOSITIONING AS CONDITION ALLOWS. REDISTRIBUTE PRESSURE 
UTILIZING PILLOWS AND WEDGES. BID/PRN MOISTURE BARRIER CREAM AS PREVENTATIVE. SKIN/WOUND 
CARE PLAN. CONTINUED MONITORING BY WOUND CARE TEAM.

## 2020-09-04 NOTE — NUR
D/C Planning 



COVID test is still pending. Test results need to be faxed to facility before patient can be 
transfer to facility Fax: [phone]. CARI Egan was informed.

## 2020-09-04 NOTE — NUR
D/C Planning 



Regarding social service consult for SNF placement at [facility] Ph:([phone]). Spoke 
to patient regarding placement. Patient is in agreement of placement. Faxed clinical 
information to [facility]. Per Yohannes Ph:( [phone]) or Ph:( [phone]) patient 
has been accepted and will assign a room and following doctor once they received COVID test 
result. Informed RN Julisa [facility] is requesting COVID test.

## 2020-09-04 NOTE — NUR
Opening Shift Note

Assumed care of patient, awake and alert.  No S/S of distress/SOB or pain.  Instructed on 
POC and to call for assist PRN, will continue to monitor for changes Q1hr and PRN.

PER PT, PAIN ON HER RIGHT LEG DURING MOVEMENT, OTHERWISE HER PAIN IS 0/10..

## 2020-09-05 VITALS — DIASTOLIC BLOOD PRESSURE: 74 MMHG | SYSTOLIC BLOOD PRESSURE: 136 MMHG

## 2020-09-05 VITALS — SYSTOLIC BLOOD PRESSURE: 136 MMHG | DIASTOLIC BLOOD PRESSURE: 74 MMHG

## 2020-09-05 VITALS — DIASTOLIC BLOOD PRESSURE: 70 MMHG | SYSTOLIC BLOOD PRESSURE: 140 MMHG

## 2020-09-05 VITALS — SYSTOLIC BLOOD PRESSURE: 128 MMHG | DIASTOLIC BLOOD PRESSURE: 80 MMHG

## 2020-09-05 RX ADMIN — SODIUM CHLORIDE SCH MLS/HR: 9 INJECTION, SOLUTION INTRAVENOUS at 13:30

## 2020-09-05 RX ADMIN — Medication SCH TAB: at 09:51

## 2020-09-05 RX ADMIN — ENOXAPARIN SODIUM SCH MG: 40 INJECTION SUBCUTANEOUS at 09:51

## 2020-09-05 RX ADMIN — TRIAMTERENE AND HYDROCHLOROTHIAZIDE SCH CAP: 25; 37.5 CAPSULE ORAL at 09:51

## 2020-09-05 RX ADMIN — CEFAZOLIN SODIUM SCH MLS/HR: 1 INJECTION, SOLUTION INTRAVENOUS at 05:16

## 2020-09-05 RX ADMIN — SODIUM CHLORIDE SCH MLS/HR: 0.9 INJECTION, SOLUTION INTRAVENOUS at 09:58

## 2020-09-05 NOTE — NUR
Discharge instructions given as ordered. All questions and concerns addressed. Patient 
verbalized understanding. IV removed with catheter intact, pressure dressing applied, andersen 
catheter removed. Medication reconciliation form completed and copy given to patient.  
Patient transported by [facility] with all personal belongings. No distress 
noted at time of departure.

## 2020-09-05 NOTE — NUR
Closing note.

patient up to [facility] for BM. tylenol for pain times 1. vital signs stable patient resting in 
bed comfortably.

## 2020-09-05 NOTE — NUR
1120 9/5/20 - Contacted by Alyssia from [facility] who stated patient will be transported 
to [facility] via [facility] on [facility] between [phone].  Alyssia requested 
to include copy of COVID results in discharge papers. Informed nurse Julisa of information 
stated above.

-------------------------------------------------------------------------------

Addendum: 09/05/20 at 1148 by Hanh Nielson RN 

-------------------------------------------------------------------------------

1145 9/5/20 Patient has been assigned to room One at [facility]